# Patient Record
Sex: FEMALE | Race: WHITE | Employment: OTHER | URBAN - METROPOLITAN AREA
[De-identification: names, ages, dates, MRNs, and addresses within clinical notes are randomized per-mention and may not be internally consistent; named-entity substitution may affect disease eponyms.]

---

## 2024-02-08 ENCOUNTER — OFFICE VISIT (OUTPATIENT)
Dept: GASTROENTEROLOGY | Age: 69
End: 2024-02-08
Payer: MEDICARE

## 2024-02-08 ENCOUNTER — PREP FOR PROCEDURE (OUTPATIENT)
Dept: GASTROENTEROLOGY | Age: 69
End: 2024-02-08

## 2024-02-08 VITALS
SYSTOLIC BLOOD PRESSURE: 107 MMHG | OXYGEN SATURATION: 99 % | WEIGHT: 119 LBS | RESPIRATION RATE: 14 BRPM | TEMPERATURE: 98.2 F | DIASTOLIC BLOOD PRESSURE: 41 MMHG | HEIGHT: 63 IN | HEART RATE: 57 BPM | BODY MASS INDEX: 21.09 KG/M2

## 2024-02-08 DIAGNOSIS — K31.6 GASTRIC FISTULA: Primary | ICD-10-CM

## 2024-02-08 DIAGNOSIS — R10.13 EPIGASTRIC ABDOMINAL PAIN: ICD-10-CM

## 2024-02-08 DIAGNOSIS — K31.6 GASTRIC FISTULA: ICD-10-CM

## 2024-02-08 PROCEDURE — 1123F ACP DISCUSS/DSCN MKR DOCD: CPT | Performed by: INTERNAL MEDICINE

## 2024-02-08 PROCEDURE — 99203 OFFICE O/P NEW LOW 30 MIN: CPT | Performed by: INTERNAL MEDICINE

## 2024-02-08 RX ORDER — SODIUM CHLORIDE 9 MG/ML
25 INJECTION, SOLUTION INTRAVENOUS PRN
Status: CANCELLED | OUTPATIENT
Start: 2024-02-08

## 2024-02-08 RX ORDER — SODIUM CHLORIDE 0.9 % (FLUSH) 0.9 %
5-40 SYRINGE (ML) INJECTION EVERY 12 HOURS SCHEDULED
Status: CANCELLED | OUTPATIENT
Start: 2024-02-08

## 2024-02-08 RX ORDER — SODIUM CHLORIDE 0.9 % (FLUSH) 0.9 %
5-40 SYRINGE (ML) INJECTION PRN
Status: CANCELLED | OUTPATIENT
Start: 2024-02-08

## 2024-02-08 NOTE — PROGRESS NOTES
Gastroenterology and Interventional Endoscopy Consult Note    Date of visit   :  02/08/24    Referring Physician:  Dr. Cassius Jordan    Patient name :  Juli Nieto  YOB: 1955    HPI:    Patient is a 68 y.o. 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    Feeding tube in April that was leaking and was removed in August for TPN, and fistula from PEG is still leaking and burning, with occasional blood. Also, she states that there will be occasional spasms at the fistula site. Wasn't eating due to nausea and vomiting for years. She has history of diverticulitis that \"ruptured\".     She's still been having difficulty getting proper nutrition due to nausea and vomiting for years. She has history of diverticulitis that \"ruptured\". Currently able to eat baby food and gained weight since on TPN. Endorses diarrhea without blood. She has reflux but controlled with PPI BID. She reports having EGD with Dr. Jordan in the last few months where he placed clips and likely purastat to help with healing.     ____________________      Labs:  No results found for: \"HGB\", \"HCT\", \"WBC\", \"PLT\", \"MCV\", \"RETIC\", \"FERRITIN\", \"TIBC\", \"COBALAMINS\", \"HAPTOGLOBIN\", \"NA\", \"K\", \"CL\", \"CO2\", \"BUN\", \"TSH\", \"THYROXINE\", \"ESR\"    No results found for: \"AST\", \"ALT\", \"ALBUMIN\", \"INR\", \"HAAB\", \"HBSAG\"    ____________________    Past Medical History:  No past medical history on file.    Surgical History:  No past surgical history on file.    Medications:  No current outpatient medications on file prior to visit.     No current facility-administered medications on file prior to visit.         Allergies:  No Known Allergies    Social History:  Social History     Socioeconomic History    Marital status: Single     Spouse name: Not on file    Number of children: Not on file    Years of education: Not on file    Highest education level: Not on file   Occupational History    Not on file   Tobacco Use

## 2024-02-12 ENCOUNTER — TELEPHONE (OUTPATIENT)
Age: 69
End: 2024-02-12

## 2024-02-12 NOTE — TELEPHONE ENCOUNTER
Patient called to see if she could reschedule her procedure on 2/21/24 for the next week since that's when her ride will be available.

## 2024-02-26 RX ORDER — LEVOTHYROXINE SODIUM 0.1 MG/1
100 TABLET ORAL DAILY
COMMUNITY
Start: 2023-12-21

## 2024-02-26 RX ORDER — LOPERAMIDE HYDROCHLORIDE 2 MG/1
CAPSULE ORAL 4 TIMES DAILY PRN
COMMUNITY
Start: 2024-01-28

## 2024-02-26 RX ORDER — LANSOPRAZOLE 30 MG/1
CAPSULE, DELAYED RELEASE ORAL DAILY
COMMUNITY
Start: 2024-01-31

## 2024-02-26 RX ORDER — LANOLIN ALCOHOL/MO/W.PET/CERES
1000 CREAM (GRAM) TOPICAL DAILY
COMMUNITY

## 2024-02-26 NOTE — PERIOP NOTE
Patient verified name, , and procedure.    Type: 1a; abbreviated assessment per anesthesia guidelines  Labs per surgeon: none  Labs per anesthesia: none      Instructed pt that they will be notified by the Gi Lab for time of arrival. If any questions please call the GI lab at [phone].    Follow diet and prep instructions per office. May have clear liquids until 4 hours prior to time of arrival.    Bath or shower the night before and the am of surgery with antibacterial soap. No lotions, oils, powders, cologne on skin. No make up, eye make up or jewelry. Wear loose fitting comfortable, clean clothing.     Must have adult present in building the entire time .     Medications for the day of procedure Levothyroxine (Synthroid) and Lansoprazole (Prevacid)      The following discharge instructions reviewed with patient: medication given during procedure may cause drowsiness for several hours, therefore, do not drive or operate machinery for remainder of the day, no alcohol on the day of your procedure, resume regular diet and activity unless otherwise directed, for mild sore throat you may use Cepacol throat lozenges or warm salt water gargles as needed, call your physician for any problems or questions. Patient verbalizes understanding.

## 2024-02-28 ENCOUNTER — ANESTHESIA (OUTPATIENT)
Dept: ENDOSCOPY | Age: 69
End: 2024-02-28
Payer: MEDICARE

## 2024-02-28 ENCOUNTER — ANESTHESIA EVENT (OUTPATIENT)
Dept: ENDOSCOPY | Age: 69
End: 2024-02-28
Payer: MEDICARE

## 2024-02-28 ENCOUNTER — HOSPITAL ENCOUNTER (OUTPATIENT)
Age: 69
Setting detail: OUTPATIENT SURGERY
Discharge: HOME | End: 2024-02-28
Attending: INTERNAL MEDICINE | Admitting: INTERNAL MEDICINE
Payer: MEDICARE

## 2024-02-28 VITALS
OXYGEN SATURATION: 95 % | DIASTOLIC BLOOD PRESSURE: 68 MMHG | RESPIRATION RATE: 16 BRPM | HEIGHT: 63 IN | HEART RATE: 73 BPM | BODY MASS INDEX: 21.26 KG/M2 | SYSTOLIC BLOOD PRESSURE: 140 MMHG | WEIGHT: 120 LBS | TEMPERATURE: 97.8 F

## 2024-02-28 PROCEDURE — 2709999900 HC NON-CHARGEABLE SUPPLY: Performed by: INTERNAL MEDICINE

## 2024-02-28 PROCEDURE — C1768 GRAFT, VASCULAR: HCPCS | Performed by: INTERNAL MEDICINE

## 2024-02-28 PROCEDURE — 2500000003 HC RX 250 WO HCPCS: Performed by: NURSE ANESTHETIST, CERTIFIED REGISTERED

## 2024-02-28 PROCEDURE — 6360000002 HC RX W HCPCS: Performed by: NURSE ANESTHETIST, CERTIFIED REGISTERED

## 2024-02-28 PROCEDURE — 7100000001 HC PACU RECOVERY - ADDTL 15 MIN: Performed by: INTERNAL MEDICINE

## 2024-02-28 PROCEDURE — 43870 CLOSURE GASTROSTOMY SURGICAL: CPT | Performed by: INTERNAL MEDICINE

## 2024-02-28 PROCEDURE — 3700000000 HC ANESTHESIA ATTENDED CARE: Performed by: INTERNAL MEDICINE

## 2024-02-28 PROCEDURE — 7100000000 HC PACU RECOVERY - FIRST 15 MIN: Performed by: INTERNAL MEDICINE

## 2024-02-28 PROCEDURE — 2580000003 HC RX 258: Performed by: ANESTHESIOLOGY

## 2024-02-28 PROCEDURE — 2720000010 HC SURG SUPPLY STERILE: Performed by: INTERNAL MEDICINE

## 2024-02-28 PROCEDURE — 3609017100 HC EGD: Performed by: INTERNAL MEDICINE

## 2024-02-28 PROCEDURE — 7100000010 HC PHASE II RECOVERY - FIRST 15 MIN: Performed by: INTERNAL MEDICINE

## 2024-02-28 PROCEDURE — 3700000001 HC ADD 15 MINUTES (ANESTHESIA): Performed by: INTERNAL MEDICINE

## 2024-02-28 PROCEDURE — 6360000002 HC RX W HCPCS: Performed by: ANESTHESIOLOGY

## 2024-02-28 RX ORDER — SODIUM CHLORIDE, SODIUM LACTATE, POTASSIUM CHLORIDE, CALCIUM CHLORIDE 600; 310; 30; 20 MG/100ML; MG/100ML; MG/100ML; MG/100ML
INJECTION, SOLUTION INTRAVENOUS CONTINUOUS
Status: DISCONTINUED | OUTPATIENT
Start: 2024-02-28 | End: 2024-02-28 | Stop reason: HOSPADM

## 2024-02-28 RX ORDER — LIDOCAINE HYDROCHLORIDE 20 MG/ML
INJECTION, SOLUTION EPIDURAL; INFILTRATION; INTRACAUDAL; PERINEURAL PRN
Status: DISCONTINUED | OUTPATIENT
Start: 2024-02-28 | End: 2024-02-28 | Stop reason: SDUPTHER

## 2024-02-28 RX ORDER — HYDROMORPHONE HYDROCHLORIDE 2 MG/ML
0.5 INJECTION, SOLUTION INTRAMUSCULAR; INTRAVENOUS; SUBCUTANEOUS EVERY 5 MIN PRN
Status: DISCONTINUED | OUTPATIENT
Start: 2024-02-28 | End: 2024-02-28 | Stop reason: HOSPADM

## 2024-02-28 RX ORDER — PROPOFOL 10 MG/ML
INJECTION, EMULSION INTRAVENOUS CONTINUOUS PRN
Status: DISCONTINUED | OUTPATIENT
Start: 2024-02-28 | End: 2024-02-28 | Stop reason: SDUPTHER

## 2024-02-28 RX ADMIN — HYDROMORPHONE HYDROCHLORIDE 0.5 MG: 2 INJECTION, SOLUTION INTRAMUSCULAR; INTRAVENOUS; SUBCUTANEOUS at 14:31

## 2024-02-28 RX ADMIN — SODIUM CHLORIDE, POTASSIUM CHLORIDE, SODIUM LACTATE AND CALCIUM CHLORIDE: 600; 310; 30; 20 INJECTION, SOLUTION INTRAVENOUS at 13:00

## 2024-02-28 RX ADMIN — PROPOFOL 180 MCG/KG/MIN: 10 INJECTION, EMULSION INTRAVENOUS at 13:23

## 2024-02-28 RX ADMIN — LIDOCAINE HYDROCHLORIDE 100 MG: 20 INJECTION, SOLUTION EPIDURAL; INFILTRATION; INTRACAUDAL; PERINEURAL at 13:23

## 2024-02-28 RX ADMIN — PROPOFOL 50 MG: 10 INJECTION, EMULSION INTRAVENOUS at 13:25

## 2024-02-28 RX ADMIN — PROPOFOL 50 MG: 10 INJECTION, EMULSION INTRAVENOUS at 13:24

## 2024-02-28 ASSESSMENT — PAIN SCALES - GENERAL: PAINLEVEL_OUTOF10: 7

## 2024-02-28 ASSESSMENT — PAIN - FUNCTIONAL ASSESSMENT: PAIN_FUNCTIONAL_ASSESSMENT: 0-10

## 2024-02-28 ASSESSMENT — PAIN DESCRIPTION - LOCATION: LOCATION: ABDOMEN

## 2024-02-28 NOTE — ANESTHESIA PRE PROCEDURE
Department of Anesthesiology  Preprocedure Note       Name:  Juli Nieto   Age:  68 y.o.  :  1955                                          MRN:  [id number]         Date:  2024      Surgeon: Surgeon(s):  Sudhir Taylor MD    Procedure: Procedure(s):  EGD ESOPHAGOGASTRODUODENOSCOPY W SUTURING    Medications prior to admission:   Prior to Admission medications    Medication Sig Start Date End Date Taking? Authorizing Provider   lansoprazole (PREVACID) 30 MG delayed release capsule daily 24  Yes Alejandro Dooley MD   levothyroxine (SYNTHROID) 100 MCG tablet Take 1 tablet by mouth daily 23  Yes Alejandro Dooley MD   loperamide (IMODIUM) 2 MG capsule 4 times daily as needed 24  Yes Alejandro Dooley MD   vitamin D (CHOLECALCIFEROL) 25 MCG (1000 UT) TABS tablet Take 1 tablet by mouth daily   Yes Alejandro Dooley MD   vitamin B-12 (CYANOCOBALAMIN) 1000 MCG tablet Take 1 tablet by mouth daily   Yes Alejandro Dooley MD       Current medications:    No current facility-administered medications for this encounter.       Allergies:    Allergies   Allergen Reactions   • Levaquin [Levofloxacin] Rash       Problem List:    Patient Active Problem List   Diagnosis Code   • Gastric fistula K31.6       Past Medical History:        Diagnosis Date   • Diverticulitis    • Gastric fistula     percutaneous gastric fistula   • GERD (gastroesophageal reflux disease)    • PONV (postoperative nausea and vomiting)        Past Surgical History:        Procedure Laterality Date   • COLON SURGERY     • COLOSTOMY CLOSURE     • GASTROSTOMY TUBE PLACEMENT     • JOINT REPLACEMENT      right hip       Social History:    Social History     Tobacco Use   • Smoking status: Never     Passive exposure: Never   • Smokeless tobacco: Never   Substance Use Topics   • Alcohol use: Never                                Counseling given: Not Answered      Vital Signs (Current):   Vitals:    24 1511 24

## 2024-02-28 NOTE — ANESTHESIA POSTPROCEDURE EVALUATION
Department of Anesthesiology  Postprocedure Note    Patient: Juli Nieto  MRN: [id number]  YOB: 1955  Date of evaluation: 2/28/2024    Procedure Summary       Date: 02/28/24 Room / Location: [facility] ENDO [facility] 1 / [facility] ENDOSCOPY    Anesthesia Start: 1315 Anesthesia Stop: 1352    Procedure: EGD ESOPHAGOGASTRODUODENOSCOPY W SUTURING and APC (Upper GI Region) Diagnosis:       Gastric fistula      (Gastric fistula [K31.6])    Surgeons: Sudhir Taylor MD Responsible Provider: Dusty Powell IV, MD    Anesthesia Type: TIVA ASA Status: 3            Anesthesia Type: TIVA    Benjamín Phase I: Benjamín Score: 8    Benjamín Phase II: Benjamín Score: 10    Anesthesia Post Evaluation    Patient location during evaluation: PACU  Patient participation: complete - patient participated  Level of consciousness: awake and alert  Airway patency: patent  Nausea & Vomiting: no nausea and no vomiting  Cardiovascular status: hemodynamically stable  Respiratory status: acceptable, nonlabored ventilation and spontaneous ventilation  Hydration status: euvolemic  Comments: BP (!) 140/68   Pulse 73   Temp 97.8 °F (36.6 °C) (Temporal)   Resp 16   Ht 1.6 m (5' 3\")   Wt 54.4 kg (120 lb)   LMP  (LMP Unknown)   SpO2 95%   BMI 21.26 kg/m²     Multimodal analgesia pain management approach  Pain management: adequate and satisfactory to patient        No notable events documented.

## 2024-02-28 NOTE — H&P
Gastroenterology and Interventional Endoscopy Pre-procedure HPI    Date of visit   :  02/28/24      Patient name :  Juli Nieto  YOB: 1955    HPI:    Patient is a 68 y.o. year old female, who has been referred for gastrocutnaeous fistula closure      ____________________      Past Medical History:  Reviewed, and in prior HPI,documentation    Surgical History:    Reviewed, and in prior HPI,documentation    Medications:    Reviewed, and in prior HPI,documentation    Allergies:  Allergies   Allergen Reactions    Levaquin [Levofloxacin] Rash       Social History:    Reviewed, and in prior HPI,documentation    Family History:    Reviewed, and in prior HPI,documentation  Physical Exam:    Vitals:    02/28/24 1245   BP: (!) 142/62   Pulse: 58   Resp: 16   Temp: (!) 58 °F (14.4 °C)   SpO2: 98%     Body mass index is 21.26 kg/m².  [unfilled]      Constitutional: Alert, oriented.  No acute distress  Head: Normocephalic and Atraumatic  Eyes: No icterus, EOMI, no congestion  ENT: No deformity, no hearing loss   Cardiovascular: Regular rate and rhythm, S1-S2 normal, no murmur rub or gallop  Respiratory: Clear to auscultation bilaterally no wheezing rhonchi or crackles  Gastrointestinal:, No hepatosplenomegaly nontender nondistended bowel sounds present in all 4 quadrants  Musculoskeletal: No joint deformity, no swelling in larger joints including knees elbows hands shoulders  Skin: No new rash.  Neurologic: Alert oriented to time place and person , good affect  ____________________    Recommendations:  --Plan for EGD with suturing    Sudhir Taylor MD  Gastroenterology and Interventional Endoscopy

## 2024-02-28 NOTE — PERIOP NOTE
PACU DISCHARGE NOTE    Negar voiced understanding of discharge instructions. No questions at this time. Vital signs stable, pain well controlled, alert and oriented times three or at baseline, follow up per surgeon, no anesthetic complications.

## 2024-02-28 NOTE — OP NOTE
Procedure: EGD with gastrocutaneous fistula closure    Indication: Percutaneous gastric fistula    Date of Procedure: 2/28/2024    Patient profile: Refer to patient note in chart for documentation of history and physical    Providers: Sudhir Taylor MD    Referring MD:  Dr. Cassius Jordan     PCP: None, None    Medicines: Monitored anesthesia care    Complication: No immediate complications.     Estimated blood loss: Minimal    Procedure: After the risks including, but not limited to medication reaction, infection, bleeding, perforation, missed lesions, benefits and alternatives of the procedure were discussed with the patient and all questions were answered, informed consent was obtained. The gastroscope was passed under direct vision throughout the procedure, the patient's blood pressure pulse and oxygen saturation were monitored continuously. The scope was introduced through the mouth and advanced to the 2nd portion of the duodenum. The endoscopy was performed without difficulty. The patient tolerated the procedure well.       Findings:   --The adult gastroscope was introduced from the mouth and advanced through the esophagus to the GE junction.   --The esophagus was normal  --The scope was advanced to the stomach which was normal in forward and retroflexed views. A hiatal hernia was noted. There was a percutaneous gastric fistula noted. The scope was removed and the Apollo Overstitch device was attached to the scope at both ends. The scope was reintroduced and a total of 2, 2-0 polypropylene sutures were placed in a interrupted fashion. This was successful and the sutures were cinched.  There was GJ anastomosis noted.  --Examined portions of the small bowel were normal    The scope was pulled back, and the procedure was subsequently ended.    Impression:  --Successful closure of gastrocutaneous fistula.     Recommendations:  -- Clinic follow up in 3-4 weeks for symptom check (can be virtual)    Sudhir Taylor,

## 2024-02-29 ENCOUNTER — TELEPHONE (OUTPATIENT)
Dept: GASTROENTEROLOGY | Age: 69
End: 2024-02-29

## 2024-03-29 ENCOUNTER — TELEPHONE (OUTPATIENT)
Dept: GASTROENTEROLOGY | Age: 69
End: 2024-03-29

## 2024-03-29 NOTE — TELEPHONE ENCOUNTER
Called patient to see if wanted to follow up after procedure. She states it did not help, she is seeing her other gastro dr, and will see what his recommendations are Monday then give us a call back.

## (undated) DEVICE — SUTURE CINCH: Brand: OVERSTITCH ENDOSCOPIC SUTURING SYSTEM

## (undated) DEVICE — AIRLIFE™ OXYGEN TUBING 7 FEET (2.1 M) CRUSH RESISTANT OXYGEN TUBING, VINYL TIPPED: Brand: AIRLIFE™

## (undated) DEVICE — MOUTHPIECE ENDOSCP L CTRL OPN AND SIDE PORTS DISP

## (undated) DEVICE — FIAPC® PROBE W/ FILTER 2200 A OD 2.3MM/6.9FR; L 2.2M/7.2FT: Brand: ERBE

## (undated) DEVICE — BALLOON US E LIN RNG O KT FOR FG-32UA

## (undated) DEVICE — CONNECTOR TBNG OD5-7MM O2 END DISP

## (undated) DEVICE — CANNULA NSL ORAL AD FOR CAPNOFLEX CO2 O2 AIRLFE

## (undated) DEVICE — GAUZE,SPONGE,4"X4",12PLY,WOVEN,NS,LF: Brand: MEDLINE

## (undated) DEVICE — BLOCK BITE AD 60FR W/ VELC STRP ADDRESSES MOST PT AND

## (undated) DEVICE — SUTURE ENDOSCP SZ 2-0 POLYPR FOR OVERSTITCH SYS

## (undated) DEVICE — NEEDLE DRIVER AND ANCHOR EXCHANGE: Brand: OVERSTITCH ENDOSCOPIC SUTURING SYSTEM

## (undated) DEVICE — KENDALL RADIOLUCENT FOAM MONITORING ELECTRODE RECTANGULAR SHAPE: Brand: KENDALL

## (undated) DEVICE — ELECTRODE PT RET AD L9FT HI MOIST COND ADH HYDRGEL CORDED

## (undated) DEVICE — ENDOSCOPIC KIT 1.1+ OP4 CA DE 2 GWN AAMI LEVEL 3

## (undated) DEVICE — SINGLE PORT MANIFOLD: Brand: NEPTUNE 2

## (undated) DEVICE — TISSUE HELIX: Brand: OVERSTITCH ENDOSCOPIC SUTURING SYSTEM